# Patient Record
Sex: MALE | Race: OTHER | HISPANIC OR LATINO | URBAN - METROPOLITAN AREA
[De-identification: names, ages, dates, MRNs, and addresses within clinical notes are randomized per-mention and may not be internally consistent; named-entity substitution may affect disease eponyms.]

---

## 2019-05-26 ENCOUNTER — EMERGENCY (EMERGENCY)
Facility: HOSPITAL | Age: 29
LOS: 1 days | Discharge: HOME | End: 2019-05-26
Attending: EMERGENCY MEDICINE
Payer: COMMERCIAL

## 2019-05-26 VITALS
TEMPERATURE: 98 F | HEART RATE: 97 BPM | OXYGEN SATURATION: 97 % | WEIGHT: 250 LBS | SYSTOLIC BLOOD PRESSURE: 141 MMHG | RESPIRATION RATE: 16 BRPM | DIASTOLIC BLOOD PRESSURE: 82 MMHG | HEIGHT: 67 IN

## 2019-05-26 PROCEDURE — 99282 EMERGENCY DEPT VISIT SF MDM: CPT

## 2019-05-26 PROCEDURE — 99283 EMERGENCY DEPT VISIT LOW MDM: CPT

## 2019-05-26 NOTE — ED PROVIDER NOTE - CLINICAL SUMMARY MEDICAL DECISION MAKING FREE TEXT BOX
pt refusing xray he is clincally osbeer to refuse [place] [place] ambulatin [place] refusing eoth resources return to ed for intractable HA, persistent vomiting, or new onset motor/sensory deficits

## 2019-05-26 NOTE — ED ADULT NURSE REASSESSMENT NOTE - NS ED NURSE REASSESS COMMENT FT1
patient ambulatory around unit with a steady gait,  patient refusing all tests at this time and would like to go home Dr richardson made aware, patient to be discharged

## 2019-05-26 NOTE — ED PROVIDER NOTE - OBJECTIVE STATEMENT
30 y/o M pt presents to ED c/o R ankle pain s/p mechanical fall down 2 steps today. Pt admits to drinking several beers today. Pt states he is able to ambulate with pain. Denies LOC, and head injury. denies fever. denies HA or neck pain. no chest pain or sob. no abd pain. no n/v/d. no urinary f/u/d. no back pain. no motor or sensory deficits. denies illicit drug use. no recent travel. no rash. no other acute issues symptoms or concerns

## 2024-11-05 NOTE — ED PROVIDER NOTE - SKIN NEGATIVE STATEMENT, MLM
Pls schedule: 1.) NH3 lab 2.) visit in Jan (in person if possible) 3.) CBC, CMP, INR, AFP, U/S in 5/2025 w/ testing week after
no abrasions, no jaundice, no lesions, no pruritis, and no rashes.